# Patient Record
Sex: MALE | Race: WHITE | NOT HISPANIC OR LATINO | URBAN - METROPOLITAN AREA
[De-identification: names, ages, dates, MRNs, and addresses within clinical notes are randomized per-mention and may not be internally consistent; named-entity substitution may affect disease eponyms.]

---

## 2020-03-07 ENCOUNTER — EMERGENCY (EMERGENCY)
Facility: HOSPITAL | Age: 40
LOS: 1 days | Discharge: HOME | End: 2020-03-07
Attending: EMERGENCY MEDICINE | Admitting: EMERGENCY MEDICINE
Payer: COMMERCIAL

## 2020-03-07 VITALS
DIASTOLIC BLOOD PRESSURE: 72 MMHG | OXYGEN SATURATION: 98 % | HEART RATE: 78 BPM | TEMPERATURE: 98 F | RESPIRATION RATE: 18 BRPM | SYSTOLIC BLOOD PRESSURE: 130 MMHG

## 2020-03-07 VITALS
WEIGHT: 199.96 LBS | HEIGHT: 68 IN | OXYGEN SATURATION: 99 % | RESPIRATION RATE: 16 BRPM | HEART RATE: 80 BPM | TEMPERATURE: 98 F | DIASTOLIC BLOOD PRESSURE: 74 MMHG | SYSTOLIC BLOOD PRESSURE: 147 MMHG

## 2020-03-07 DIAGNOSIS — T78.1XXA OTHER ADVERSE FOOD REACTIONS, NOT ELSEWHERE CLASSIFIED, INITIAL ENCOUNTER: ICD-10-CM

## 2020-03-07 PROCEDURE — 96374 THER/PROPH/DIAG INJ IV PUSH: CPT

## 2020-03-07 PROCEDURE — 99284 EMERGENCY DEPT VISIT MOD MDM: CPT | Mod: 25

## 2020-03-07 PROCEDURE — 99284 EMERGENCY DEPT VISIT MOD MDM: CPT

## 2020-03-07 RX ORDER — DIPHENHYDRAMINE HCL 50 MG
1 CAPSULE ORAL
Qty: 20 | Refills: 0
Start: 2020-03-07 | End: 2020-03-11

## 2020-03-07 RX ORDER — FAMOTIDINE 10 MG/ML
1 INJECTION INTRAVENOUS
Qty: 10 | Refills: 0
Start: 2020-03-07 | End: 2020-03-11

## 2020-03-07 RX ORDER — EPINEPHRINE 0.3 MG/.3ML
0.3 INJECTION INTRAMUSCULAR; SUBCUTANEOUS
Qty: 1 | Refills: 0
Start: 2020-03-07 | End: 2020-03-07

## 2020-03-07 RX ORDER — FAMOTIDINE 10 MG/ML
20 INJECTION INTRAVENOUS ONCE
Refills: 0 | Status: COMPLETED | OUTPATIENT
Start: 2020-03-07 | End: 2020-03-07

## 2020-03-07 RX ADMIN — FAMOTIDINE 20 MILLIGRAM(S): 10 INJECTION INTRAVENOUS at 20:39

## 2020-03-07 NOTE — ED ADULT NURSE NOTE - CHIEF COMPLAINT QUOTE
lip swelling, itchy throat, itching/ hives  after dinner today (known allergy to nuts and sunflower seeds)  took his  Epipen ( last year) ; EMS gave Solumedrol 125mg IV  , Benadryl 50mg IV -now states I feel better

## 2020-03-07 NOTE — ED ADULT NURSE NOTE - OBJECTIVE STATEMENT
40 y/o male visiting from  arrived yesterday, had allergic reactions while having dinner @6pm today, had his  epi pen, EMT gave solumedrol 125mg IV and benadryl 50mg IV and fluids saline.  as per pt feeling much better now

## 2020-03-07 NOTE — ED PROVIDER NOTE - ATTENDING CONTRIBUTION TO CARE
39M hx asthma, c/o allergic reaction. pt states has allergy to sunflower seeds and had some bread that may have had seeds in it.  states developed tingling to throat and lips. itching and tightness to throat. no SOB. no cough. pt states used his epipen. given solumedrol and benadryl by EMS.  pt vomited.  now feeling better.   gen- nad  heent- ncat, clear conj, no intraoral swelling, no trismus, no stridor, no drooling  cv -rrr  lungs -ctab  abd - soft, nt, nd  ext -wwp, no edema  neuro -aox3, steady gait, medeiros  no resp distress, no airway compromise, no oral swelling, will continue to monitor for any allergic reaction recurrence.

## 2020-03-07 NOTE — ED ADULT TRIAGE NOTE - CHIEF COMPLAINT QUOTE
itchy throat, itching/ hives  after dinner today (known allergy to nuts and sunflower seeds)  took his  Epipen ( last year) ; EMS gave Solumedrol 125mg IV  , Benadryl 50mg IV -now states I feel better lip swelling, itchy throat, itching/ hives  after dinner today (known allergy to nuts and sunflower seeds)  took his  Epipen ( last year) ; EMS gave Solumedrol 125mg IV  , Benadryl 50mg IV -now states I feel better

## 2020-03-07 NOTE — ED PROVIDER NOTE - PATIENT PORTAL LINK FT
no You can access the FollowMyHealth Patient Portal offered by [facility] by registering at the following website: [email or web address]. By joining [employer]’s FollowMyHealth portal, you will also be able to view your health information using other applications (apps) compatible with our system.

## 2020-03-07 NOTE — ED PROVIDER NOTE - OBJECTIVE STATEMENT
39y M with a history of asthma and seed allergy presents to the ED with complains of allergic reaction onset 6pm. Patient states he was out to eat for his girlfriend's birthday, and ate a piece of bread he thinks had seeds in it, then onset itchy throat, tightness of throat, and lip swelling. Patient immediately used Epipen, and vomited 5x. Patient then called ambulance and was given steroids and Benadryl 30 minutes ago and feels better now. Patient notes abdominal pain from vomiting. Denies hives, fever, or chills.

## 2020-03-07 NOTE — ED PROVIDER NOTE - CLINICAL SUMMARY MEDICAL DECISION MAKING FREE TEXT BOX
39y M with a history of asthma and seed allergy presents with mouth itch, throat tightness, and lip swelling after eating bread at 6pm, thinks seeds were in the bread and used Epipen afterwards, then called EMS. Patient is now feeling better, well appearing without swelling, lungs clear, no hives. Will add pepsin and observe in ED for a few hours, if no further reaction will discharge with pepsin, steroids, and Benadryl.